# Patient Record
(demographics unavailable — no encounter records)

---

## 2025-04-06 NOTE — HISTORY OF PRESENT ILLNESS
[FreeTextEntry1] : Ms. BARKLEY is a 71-year-old female who returns for f/u with regard hx of hyperthyroidism. She had been taking methimazole for a couple of months. She had been off methimazole for 1 year. TFT's then returned in hypothyroid range. She was started on Levothyroxine  Additional medical history includes that of hyperlipidemia, hypertension, vitamin D deficiency, and herniated discs- takes Tylenol often - Had an episode of elevated BP a few months ago, has recovered. - Went to PT for a pinched nerve in her neck, feeling much better now.  In Oct 2023 TSH returned suppressed and dose was adjusted from 50 mcg to 25 mcg daily. In December 2023 TSH was still suppressed so Levothyroxine was stopped.   She did restart Methimazole 5 mg QD in Feb 2024. Methimazole was stopped in May 2024 when TSH returned at 22.30, FT4 at 0.9 and FT3 at 2.40. She denies any palpitations, sob, tremors, anxiety, weight changes, temperature intolerance, severe fatigue, change in bowel habits, changes in mood. she also denies skin, hair, or nail changes.  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 Denies stinging, burning, tearing of eyes. Eyes much better. Optho exam UTD. Chemosis has improved. Does follow with optho in [place].  Graves ab's were elevated as were both Hashimoto's ab's.  Labs 09/30/24 TSH: 2.23 FT4: 0.9 FT3: 2.8  Outside lab 12/27.24 TSH: 3.12 FT4 calculation: 2.1 (Range of 1.4-3.8)   ____________________________________________________________________________________________________ Lives in [place] for majority of the year.  Additional Medications: Olmesartan hctz 20-12.5mg, Pravastatin 40 mg along with co q 10.  Prior could not tolerate Crestor due to increased pains in hands   Takes vitamin D3 2000 IU intermittently, vitamin C intermittently  Is now on Diclofenac due to trigger finger in right first digit.

## 2025-04-06 NOTE — ADDENDUM
[FreeTextEntry1] : Blood will be drawn in office today.  This note was written by Camila Montemayor on 04/02/2025 acting as medical scribe for Dr. Josh Velazquez. I, Dr. Josh Velazquez, have read and attest that all the information, medical decision making and discharge instructions within are true and accurate.